# Patient Record
Sex: FEMALE | Race: WHITE | NOT HISPANIC OR LATINO | Employment: FULL TIME | URBAN - METROPOLITAN AREA
[De-identification: names, ages, dates, MRNs, and addresses within clinical notes are randomized per-mention and may not be internally consistent; named-entity substitution may affect disease eponyms.]

---

## 2022-02-18 NOTE — PROGRESS NOTES
Subjective:      Patient ID: Marilu Garcia is a 51 y.o. female.    Chief Complaint: Disease Management    HPI:   Includes joint pain without subjective swelling.   Antecedent event includes: None;  Pain location includes: right elbow, right hand and left hip;  Gradual onset; beginning >years ago;  Intermittent sharp, throbbing and numbness, Moderate in severity,   Lasting >minutes, Worse at all times;   Improved with none;   Worsened with activity and overuse;     Review of Systems   Constitutional: Positive for fatigue. Negative for chills and fever.   HENT: Negative for nasal congestion, ear pain, hearing loss, mouth dryness, mouth sores, nosebleeds and trouble swallowing.    Eyes: Negative for photophobia, pain, redness, visual disturbance and eye dryness.   Respiratory: Negative for cough and shortness of breath.    Cardiovascular: Negative for chest pain, palpitations and leg swelling.   Gastrointestinal: Negative for abdominal distention, abdominal pain, blood in stool, constipation, diarrhea, rectal pain, vomiting and fecal incontinence.   Endocrine: Negative for polydipsia and polyuria.   Genitourinary: Negative for bladder incontinence, dysuria, enuresis, genital sores and hematuria.   Musculoskeletal: Positive for arthralgias and myalgias. Negative for joint swelling.   Integumentary:  Negative for rash, wound and mole/lesion.   Neurological: Positive for weakness and headaches.   Hematological: Negative for adenopathy. Does not bruise/bleed easily.   Psychiatric/Behavioral: Negative for dysphoric mood and sleep disturbance. The patient is not nervous/anxious.       Past Medical History:   Diagnosis Date    Anxiety     Thyroid disease      Past Surgical History:   Procedure Laterality Date    FINGER SURGERY      gastric sleeve      HYSTERECTOMY      TONSILLECTOMY        See the Assessment/Plan for further characterization of the HPI, ROS, Medical, Surgical, Family, and Social Histories including Tobacco  "use, Meds; all of which has been reviewed in Epic.          Objective:   /66   Pulse 81   Resp 16   Ht 5' 4" (1.626 m)   Wt 56.2 kg (124 lb)   SpO2 99%   BMI 21.28 kg/m²   Physical Exam  Non-toxic appearance. No distress.   Normocephalic and atraumatic.   External ears normal.    Conjunctivae and EOM are normal. No scleral icterus.   RRR, No friction rub; palpable distal pulses.    No tachypnea or signs of respiratory distress.   Abdominal: No guarding or rebound tenderness.   Neurological: Oriented. Normal thought content. No cranial nerve deficit. Strength is grossly normal without focal deficit.  Skin is warm. No pallor.   Musculoskeletal: see below for further input. No overt synovitis.     No image results found.    No results found for any previous visit.        All of the data above and below has been reviewed by myself and any further interpretations will be reflected in the assessment and plan.   The data includes review of external notes, and independent interpretation of lab results, x-rays, and imaging reports.  Active inflammatory arthritis is an illness that poses a threat to bodily function and even a threat to life in some cases.  Drug therapy to treat inflammatory arthritis usually requires intensive monitoring for toxicity.    Review of patient's allergies indicates:  No Known Allergies  Assessment/Plan:         CAMERON+ 1:160 RF neg CRP<0.5; ESR 12  There is some history to suggest inflammatory arthritis.    There is also some mechanical and neurogenic pain.    She has some right elbow and right hand pain in ulnar distribution    Intermittent left hip pain    History trigger fingers s/p injection    Prior trigger finger surgery    Orthopedics Dr. William    Has been managing with:    Ibuprofen otc frequent    Prior tylenol codeine      Prior gastric sleeve should be careful with NSAIDs?    No overt synovitis  DJD  Also likely some spinal DJD DDD +/- scoliosis given her exam    Verbal " education fairly extensive    Blood work to further evaluate    EMG/NCS RUE given the distribution of pain     We will seek records from PCP    Revisit 3-4 weeks    Contact us prn      Has been working with:    Records Orthopedics Dr. William some of note 10/21/21: trigger finger s/p injection 10/14/21; tylenol codeine; CAMERON+ 1:160; vitD 28 Add 50,000 weekly; refer to Rheumatology  Lab 10/14/21: WBC 6.3; Hgb 13; plt 228; creat 0.7; alb 4.4 CRP<0.5; ESR 12    Review of medical records as stated above.  Lab +/- imaging and other ordered diagnostic studies to further evaluate.  See the orders associated with this note visit.  Medications as prescribed as tolerated.    Education including verbal and those noted in the patient instructions.  Revisit as scheduled and call prn.    The following diagnoses influence medical decision making and/or need further workup including the orders listed below:    Pain in other joint  -     CAMERON by IFA, w/Rflx; Future; Expected date: 03/04/2022  -     Protein Electrophoresis, Serum; Future; Expected date: 03/04/2022  -     Quantiferon Gold TB; Future; Expected date: 03/04/2022  -     Rheumatoid Factor; Future; Expected date: 03/04/2022  -     Sedimentation rate; Future; Expected date: 03/04/2022  -     Uric Acid; Future; Expected date: 03/04/2022  -     Urinalysis; Future; Expected date: 03/04/2022  -     Cyclic Citrullinated Peptide Antibody, IgG; Future; Expected date: 03/04/2022  -     C-Reactive Protein; Future; Expected date: 03/04/2022  -     Comprehensive Metabolic Panel; Future; Expected date: 03/04/2022  -     CK; Future; Expected date: 03/04/2022  -     CBC Auto Differential; Future; Expected date: 03/04/2022  -     C4 Complement; Future; Expected date: 03/04/2022  -     C3 Complement; Future; Expected date: 03/04/2022  -     Hepatitis C Antibody; Future; Expected date: 03/04/2022  -     Hepatitis B Surface Antigen; Future; Expected date: 03/04/2022  -     Hepatitis B Core  Antibody, Total; Future; Expected date: 03/04/2022  -     Hepatitis B Surface Ab, Qualitative; Future; Expected date: 03/04/2022    Polyarthralgia  -     CAMERON by IFA, w/Rflx; Future; Expected date: 03/04/2022  -     Protein Electrophoresis, Serum; Future; Expected date: 03/04/2022  -     Quantiferon Gold TB; Future; Expected date: 03/04/2022  -     Rheumatoid Factor; Future; Expected date: 03/04/2022  -     Sedimentation rate; Future; Expected date: 03/04/2022  -     Uric Acid; Future; Expected date: 03/04/2022  -     Urinalysis; Future; Expected date: 03/04/2022  -     Cyclic Citrullinated Peptide Antibody, IgG; Future; Expected date: 03/04/2022  -     C-Reactive Protein; Future; Expected date: 03/04/2022  -     Comprehensive Metabolic Panel; Future; Expected date: 03/04/2022  -     CK; Future; Expected date: 03/04/2022  -     CBC Auto Differential; Future; Expected date: 03/04/2022  -     C4 Complement; Future; Expected date: 03/04/2022  -     C3 Complement; Future; Expected date: 03/04/2022  -     Hepatitis C Antibody; Future; Expected date: 03/04/2022  -     Hepatitis B Surface Antigen; Future; Expected date: 03/04/2022  -     Hepatitis B Core Antibody, Total; Future; Expected date: 03/04/2022  -     Hepatitis B Surface Ab, Qualitative; Future; Expected date: 03/04/2022    CAMERON positive  -     CAMERON by IFA, w/Rflx; Future; Expected date: 03/04/2022  -     Protein Electrophoresis, Serum; Future; Expected date: 03/04/2022  -     Quantiferon Gold TB; Future; Expected date: 03/04/2022  -     Rheumatoid Factor; Future; Expected date: 03/04/2022  -     Sedimentation rate; Future; Expected date: 03/04/2022  -     Uric Acid; Future; Expected date: 03/04/2022  -     Urinalysis; Future; Expected date: 03/04/2022  -     Cyclic Citrullinated Peptide Antibody, IgG; Future; Expected date: 03/04/2022  -     C-Reactive Protein; Future; Expected date: 03/04/2022  -     Comprehensive Metabolic Panel; Future; Expected date:  03/04/2022  -     CK; Future; Expected date: 03/04/2022  -     CBC Auto Differential; Future; Expected date: 03/04/2022  -     C4 Complement; Future; Expected date: 03/04/2022  -     C3 Complement; Future; Expected date: 03/04/2022  -     Hepatitis C Antibody; Future; Expected date: 03/04/2022  -     Hepatitis B Surface Antigen; Future; Expected date: 03/04/2022  -     Hepatitis B Core Antibody, Total; Future; Expected date: 03/04/2022  -     Hepatitis B Surface Ab, Qualitative; Future; Expected date: 03/04/2022    Other osteoarthritis involving multiple joints  -     CAMERON by IFA, w/Rflx; Future; Expected date: 03/04/2022  -     Protein Electrophoresis, Serum; Future; Expected date: 03/04/2022  -     Quantiferon Gold TB; Future; Expected date: 03/04/2022  -     Rheumatoid Factor; Future; Expected date: 03/04/2022  -     Sedimentation rate; Future; Expected date: 03/04/2022  -     Uric Acid; Future; Expected date: 03/04/2022  -     Urinalysis; Future; Expected date: 03/04/2022  -     Cyclic Citrullinated Peptide Antibody, IgG; Future; Expected date: 03/04/2022  -     C-Reactive Protein; Future; Expected date: 03/04/2022  -     Comprehensive Metabolic Panel; Future; Expected date: 03/04/2022  -     CK; Future; Expected date: 03/04/2022  -     CBC Auto Differential; Future; Expected date: 03/04/2022  -     C4 Complement; Future; Expected date: 03/04/2022  -     C3 Complement; Future; Expected date: 03/04/2022  -     Hepatitis C Antibody; Future; Expected date: 03/04/2022  -     Hepatitis B Surface Antigen; Future; Expected date: 03/04/2022  -     Hepatitis B Core Antibody, Total; Future; Expected date: 03/04/2022  -     Hepatitis B Surface Ab, Qualitative; Future; Expected date: 03/04/2022    Neuralgia  -     CAMERON by IFA, w/Rflx; Future; Expected date: 03/04/2022  -     Protein Electrophoresis, Serum; Future; Expected date: 03/04/2022  -     Quantiferon Gold TB; Future; Expected date: 03/04/2022  -     Rheumatoid Factor;  Future; Expected date: 03/04/2022  -     Sedimentation rate; Future; Expected date: 03/04/2022  -     Uric Acid; Future; Expected date: 03/04/2022  -     Urinalysis; Future; Expected date: 03/04/2022  -     Cyclic Citrullinated Peptide Antibody, IgG; Future; Expected date: 03/04/2022  -     C-Reactive Protein; Future; Expected date: 03/04/2022  -     Comprehensive Metabolic Panel; Future; Expected date: 03/04/2022  -     CK; Future; Expected date: 03/04/2022  -     CBC Auto Differential; Future; Expected date: 03/04/2022  -     C4 Complement; Future; Expected date: 03/04/2022  -     C3 Complement; Future; Expected date: 03/04/2022  -     Hepatitis C Antibody; Future; Expected date: 03/04/2022  -     Hepatitis B Surface Antigen; Future; Expected date: 03/04/2022  -     Hepatitis B Core Antibody, Total; Future; Expected date: 03/04/2022  -     Hepatitis B Surface Ab, Qualitative; Future; Expected date: 03/04/2022  -     EMG W/ ULTRASOUND AND NERVE CONDUCTION TEST 1 Extremity; Future; Expected date: 03/04/2022    Myalgia  -     CAMERON by IFA, w/Rflx; Future; Expected date: 03/04/2022  -     Protein Electrophoresis, Serum; Future; Expected date: 03/04/2022  -     Quantiferon Gold TB; Future; Expected date: 03/04/2022  -     Rheumatoid Factor; Future; Expected date: 03/04/2022  -     Sedimentation rate; Future; Expected date: 03/04/2022  -     Uric Acid; Future; Expected date: 03/04/2022  -     Urinalysis; Future; Expected date: 03/04/2022  -     Cyclic Citrullinated Peptide Antibody, IgG; Future; Expected date: 03/04/2022  -     C-Reactive Protein; Future; Expected date: 03/04/2022  -     Comprehensive Metabolic Panel; Future; Expected date: 03/04/2022  -     CK; Future; Expected date: 03/04/2022  -     CBC Auto Differential; Future; Expected date: 03/04/2022  -     C4 Complement; Future; Expected date: 03/04/2022  -     C3 Complement; Future; Expected date: 03/04/2022  -     Hepatitis C Antibody; Future; Expected date:  03/04/2022  -     Hepatitis B Surface Antigen; Future; Expected date: 03/04/2022  -     Hepatitis B Core Antibody, Total; Future; Expected date: 03/04/2022  -     Hepatitis B Surface Ab, Qualitative; Future; Expected date: 03/04/2022    Screening for rheumatic disorder  -     CAMERON by IFA, w/Rflx; Future; Expected date: 03/04/2022  -     Protein Electrophoresis, Serum; Future; Expected date: 03/04/2022  -     Quantiferon Gold TB; Future; Expected date: 03/04/2022  -     Rheumatoid Factor; Future; Expected date: 03/04/2022  -     Sedimentation rate; Future; Expected date: 03/04/2022  -     Uric Acid; Future; Expected date: 03/04/2022  -     Urinalysis; Future; Expected date: 03/04/2022  -     Cyclic Citrullinated Peptide Antibody, IgG; Future; Expected date: 03/04/2022  -     C-Reactive Protein; Future; Expected date: 03/04/2022  -     Comprehensive Metabolic Panel; Future; Expected date: 03/04/2022  -     CK; Future; Expected date: 03/04/2022  -     CBC Auto Differential; Future; Expected date: 03/04/2022  -     C4 Complement; Future; Expected date: 03/04/2022  -     C3 Complement; Future; Expected date: 03/04/2022  -     Hepatitis C Antibody; Future; Expected date: 03/04/2022  -     Hepatitis B Surface Antigen; Future; Expected date: 03/04/2022  -     Hepatitis B Core Antibody, Total; Future; Expected date: 03/04/2022  -     Hepatitis B Surface Ab, Qualitative; Future; Expected date: 03/04/2022    Encounter for hepatitis C screening test for low risk patient  -     CAMERON by IFA, w/Rflx; Future; Expected date: 03/04/2022  -     Protein Electrophoresis, Serum; Future; Expected date: 03/04/2022  -     Quantiferon Gold TB; Future; Expected date: 03/04/2022  -     Rheumatoid Factor; Future; Expected date: 03/04/2022  -     Sedimentation rate; Future; Expected date: 03/04/2022  -     Uric Acid; Future; Expected date: 03/04/2022  -     Urinalysis; Future; Expected date: 03/04/2022  -     Cyclic Citrullinated Peptide Antibody,  IgG; Future; Expected date: 03/04/2022  -     C-Reactive Protein; Future; Expected date: 03/04/2022  -     Comprehensive Metabolic Panel; Future; Expected date: 03/04/2022  -     CK; Future; Expected date: 03/04/2022  -     CBC Auto Differential; Future; Expected date: 03/04/2022  -     C4 Complement; Future; Expected date: 03/04/2022  -     C3 Complement; Future; Expected date: 03/04/2022  -     Hepatitis C Antibody; Future; Expected date: 03/04/2022  -     Hepatitis B Surface Antigen; Future; Expected date: 03/04/2022  -     Hepatitis B Core Antibody, Total; Future; Expected date: 03/04/2022  -     Hepatitis B Surface Ab, Qualitative; Future; Expected date: 03/04/2022      Follow up in about 4 weeks (around 4/1/2022).     Dre Chow MD

## 2022-03-04 ENCOUNTER — OFFICE VISIT (OUTPATIENT)
Dept: RHEUMATOLOGY | Facility: CLINIC | Age: 52
End: 2022-03-04
Payer: MEDICAID

## 2022-03-04 VITALS
SYSTOLIC BLOOD PRESSURE: 103 MMHG | HEIGHT: 64 IN | OXYGEN SATURATION: 99 % | HEART RATE: 81 BPM | BODY MASS INDEX: 21.17 KG/M2 | RESPIRATION RATE: 16 BRPM | DIASTOLIC BLOOD PRESSURE: 66 MMHG | WEIGHT: 124 LBS

## 2022-03-04 DIAGNOSIS — M25.59 PAIN IN OTHER JOINT: Primary | ICD-10-CM

## 2022-03-04 DIAGNOSIS — Z13.89 SCREENING FOR RHEUMATIC DISORDER: ICD-10-CM

## 2022-03-04 DIAGNOSIS — R76.8 ANA POSITIVE: ICD-10-CM

## 2022-03-04 DIAGNOSIS — Z11.59 ENCOUNTER FOR HEPATITIS C SCREENING TEST FOR LOW RISK PATIENT: ICD-10-CM

## 2022-03-04 DIAGNOSIS — M15.8 OTHER OSTEOARTHRITIS INVOLVING MULTIPLE JOINTS: ICD-10-CM

## 2022-03-04 DIAGNOSIS — M79.2 NEURALGIA: ICD-10-CM

## 2022-03-04 DIAGNOSIS — M25.50 POLYARTHRALGIA: ICD-10-CM

## 2022-03-04 DIAGNOSIS — M79.10 MYALGIA: ICD-10-CM

## 2022-03-04 PROCEDURE — 3008F PR BODY MASS INDEX (BMI) DOCUMENTED: ICD-10-PCS | Mod: CPTII,S$GLB,, | Performed by: INTERNAL MEDICINE

## 2022-03-04 PROCEDURE — 99204 PR OFFICE/OUTPT VISIT, NEW, LEVL IV, 45-59 MIN: ICD-10-PCS | Mod: S$GLB,,, | Performed by: INTERNAL MEDICINE

## 2022-03-04 PROCEDURE — 3078F PR MOST RECENT DIASTOLIC BLOOD PRESSURE < 80 MM HG: ICD-10-PCS | Mod: CPTII,S$GLB,, | Performed by: INTERNAL MEDICINE

## 2022-03-04 PROCEDURE — 1159F PR MEDICATION LIST DOCUMENTED IN MEDICAL RECORD: ICD-10-PCS | Mod: CPTII,S$GLB,, | Performed by: INTERNAL MEDICINE

## 2022-03-04 PROCEDURE — 3078F DIAST BP <80 MM HG: CPT | Mod: CPTII,S$GLB,, | Performed by: INTERNAL MEDICINE

## 2022-03-04 PROCEDURE — 3074F SYST BP LT 130 MM HG: CPT | Mod: CPTII,S$GLB,, | Performed by: INTERNAL MEDICINE

## 2022-03-04 PROCEDURE — 1160F PR REVIEW ALL MEDS BY PRESCRIBER/CLIN PHARMACIST DOCUMENTED: ICD-10-PCS | Mod: CPTII,S$GLB,, | Performed by: INTERNAL MEDICINE

## 2022-03-04 PROCEDURE — 1160F RVW MEDS BY RX/DR IN RCRD: CPT | Mod: CPTII,S$GLB,, | Performed by: INTERNAL MEDICINE

## 2022-03-04 PROCEDURE — 99204 OFFICE O/P NEW MOD 45 MIN: CPT | Mod: S$GLB,,, | Performed by: INTERNAL MEDICINE

## 2022-03-04 PROCEDURE — 3008F BODY MASS INDEX DOCD: CPT | Mod: CPTII,S$GLB,, | Performed by: INTERNAL MEDICINE

## 2022-03-04 PROCEDURE — 1159F MED LIST DOCD IN RCRD: CPT | Mod: CPTII,S$GLB,, | Performed by: INTERNAL MEDICINE

## 2022-03-04 PROCEDURE — 3074F PR MOST RECENT SYSTOLIC BLOOD PRESSURE < 130 MM HG: ICD-10-PCS | Mod: CPTII,S$GLB,, | Performed by: INTERNAL MEDICINE

## 2022-03-04 RX ORDER — ERGOCALCIFEROL 1.25 MG/1
CAPSULE ORAL
COMMUNITY
Start: 2021-12-12

## 2022-03-04 RX ORDER — LEVOTHYROXINE SODIUM 125 UG/1
125 TABLET ORAL
COMMUNITY

## 2022-03-04 RX ORDER — SERTRALINE HYDROCHLORIDE 100 MG/1
100 TABLET, FILM COATED ORAL DAILY
COMMUNITY
Start: 2022-02-14

## 2022-04-05 NOTE — PROGRESS NOTES
Subjective:      Patient ID: Marilu Garcia is a 51 y.o. female.    Chief Complaint: Disease Management    HPI:   Includes joint pain without subjective swelling.   Antecedent event includes: None;  Pain location includes: right elbow;  Gradual onset; beginning >years ago;  Intermittent ache, Mild in severity,   Lasting >minutes, Worse during the daytime;   Improved with rest, medication, change in position and none;   Worsened with activity and overuse;     Review of Systems   Constitutional: Positive for fatigue. Negative for chills and fever.   HENT: Negative for nasal congestion, ear pain, hearing loss, mouth dryness, mouth sores, nosebleeds and trouble swallowing.    Eyes: Negative for photophobia, pain, redness, visual disturbance and eye dryness.   Respiratory: Negative for cough and shortness of breath.    Cardiovascular: Negative for chest pain, palpitations and leg swelling.   Gastrointestinal: Negative for abdominal distention, abdominal pain, blood in stool, constipation, diarrhea, rectal pain, vomiting and fecal incontinence.   Endocrine: Negative for polydipsia and polyuria.   Genitourinary: Negative for bladder incontinence, dysuria, enuresis, genital sores and hematuria.   Musculoskeletal: Positive for arthralgias and myalgias. Negative for joint swelling.   Integumentary:  Negative for rash, wound and mole/lesion.   Neurological: Negative for weakness, numbness and headaches.   Hematological: Negative for adenopathy. Does not bruise/bleed easily.   Psychiatric/Behavioral: Negative for dysphoric mood and sleep disturbance. The patient is not nervous/anxious.       Past Medical History:   Diagnosis Date    Anxiety     Thyroid disease      Past Surgical History:   Procedure Laterality Date    FINGER SURGERY      gastric sleeve      HYSTERECTOMY      TONSILLECTOMY        See the Assessment/Plan for further characterization of the HPI, ROS, Medical, Surgical, Family, and Social Histories including  "Tobacco use, Meds; all of which has been reviewed in Epic.    Medication List with Changes/Refills   Current Medications    ERGOCALCIFEROL (ERGOCALCIFEROL) 50,000 UNIT CAP        LEVOTHYROXINE (SYNTHROID) 125 MCG TABLET    Take 125 mcg by mouth before breakfast.    SERTRALINE (ZOLOFT) 100 MG TABLET    Take 100 mg by mouth once daily.         Objective:   BP 95/63   Pulse 73   Resp 15   Ht 5' 4" (1.626 m)   Wt 59 kg (130 lb)   SpO2 98%   BMI 22.31 kg/m²   Physical Exam  Non-toxic appearance. No distress.   Normocephalic and atraumatic.   External ears normal.    Conjunctivae and EOM are normal. No scleral icterus.   RRR, No friction rub; palpable distal pulses.    No tachypnea or signs of respiratory distress.   Abdominal: No guarding or rebound tenderness.   Neurological: Oriented. Normal thought content. No cranial nerve deficit. Strength is grossly normal without focal deficit.  Skin is warm. No pallor.   Musculoskeletal: see below for further input. No overt synovitis.     No image results found.    No visits with results within 1 Year(s) from this visit.   Latest known visit with results is:   No results found for any previous visit.        All of the data above and below has been reviewed by myself and any further interpretations will be reflected in the assessment and plan.   The data includes review of external notes, and independent interpretation of lab results, x-rays, and imaging reports.  Active inflammatory arthritis is an illness that poses a threat to bodily function and even a threat to life in some cases.  Drug therapy to treat inflammatory arthritis usually requires intensive monitoring for toxicity.    Review of patient's allergies indicates:  No Known Allergies  Assessment/Plan:         Prev noted/prior plan:  (No overt synovitis  DJD  Also likely some spinal DJD DDD +/- scoliosis given her exam     Verbal education fairly extensive     Blood work to further evaluate     EMG/NCS RUE given " the distribution of pain      We will seek records from PCP     Revisit 3-4 weeks     Contact us prn)    This visit:    Interim lab 4/4/22:    SPEP normal      CAMERON neg RF neg CCP neg HepBsAbnegcoreAb neg  HCV neg uric acid 3.9    CPK 72    WBC 4.9; Hgb 13; plt 211    C3 47    C4 28      Creat 0.7; alb 4.6    2022 TB negative      ESR 14 CRP<0.5      Interim 4/1/22 EMG/NCS RUE given the distribution of pain: normal      Interim records from PCP: not sent      Ibuprofen otc frequent    She has some interim pain in right elbow and myalgias    She also has chronic right ankle problem since injury 5 months ago     she is working on seeing Orthopedics; she has held off but has plans and could also work on the elbow with them if needed    No overt synovitis  Right hand surgical scar mild deformity DJD  Also likely some spinal DJD DDD +/- scoliosis given her exam    Went over lab in detail and EMG/NCS    The low C3 normal ESR is noted; repeat CAMERON neg    We need to observe    Consider plaquenil if any persistent symptoms suggestive inflammatory arthritis     She is to see Orthopedics    We will reseek records from PCP    Revisit 4 months with lab 2 weeks prior      Contact us prn      CAMERON+ 1:160 repeat CAMERON neg CAMERON neg RF neg CCP neg HepBsAbnegcoreAb neg  HCV neg uric acid 3.9  RF neg CRP<0.5; ESR 12  There is some history to suggest inflammatory arthritis.     There is also some mechanical and neurogenic pain.     She has some right elbow and right hand pain in ulnar distribution     Intermittent left hip pain     History trigger fingers s/p injection     Prior trigger finger surgery     Orthopedics Dr. William     Has been managing with:     Ibuprofen otc frequent     Prior tylenol codeine      Prior gastric sleeve should be careful with NSAIDs?     Prev noted/prior plan:  (No overt synovitis  DJD  Also likely some spinal DJD DDD +/- scoliosis given her exam  Verbal education fairly extensive   Blood work to further  evaluate   EMG/NCS RUE given the distribution of pain    We will seek records from PCP  Revisit 3-4 weeks  Contact us prn)     Has been working with:     Records Orthopedics Dr. William some of note 10/21/21: trigger finger s/p injection 10/14/21; tylenol codeine; CAMERON+ 1:160; vitD 28 Add 50,000 weekly; refer to Rheumatology  Lab 10/14/21: WBC 6.3; Hgb 13; plt 228; creat 0.7; alb 4.4 CRP<0.5; ESR 12     Review of medical records as stated above.  Lab +/- imaging and other ordered diagnostic studies to further evaluate.  See the orders associated with this note visit.  Medications as prescribed as tolerated.    Education including verbal and those noted in the patient instructions.  Revisit as scheduled and call prn.    The following diagnoses influence medical decision making and/or need further workup including the orders listed below:    Pain in other joint  -     CAMERON by IFA, w/Rflx; Future; Expected date: 08/01/2022  -     Comprehensive Metabolic Panel; Future; Expected date: 08/01/2022  -     C-Reactive Protein; Future; Expected date: 08/01/2022  -     Anti-DNA Ab, Double-Stranded; Future; Expected date: 08/01/2022  -     C3 Complement; Future; Expected date: 08/01/2022  -     C4 Complement; Future; Expected date: 08/01/2022  -     CBC Auto Differential; Future; Expected date: 08/01/2022  -     Urinalysis Microscopic; Future; Expected date: 08/01/2022  -     Sedimentation rate; Future; Expected date: 08/01/2022  -     CK; Future; Expected date: 08/01/2022    Right elbow tendonitis    Polyarthralgia  -     CAMERON by IFA, w/Rflx; Future; Expected date: 08/01/2022  -     Comprehensive Metabolic Panel; Future; Expected date: 08/01/2022  -     C-Reactive Protein; Future; Expected date: 08/01/2022  -     Anti-DNA Ab, Double-Stranded; Future; Expected date: 08/01/2022  -     C3 Complement; Future; Expected date: 08/01/2022  -     C4 Complement; Future; Expected date: 08/01/2022  -     CBC Auto Differential; Future; Expected  date: 08/01/2022  -     Urinalysis Microscopic; Future; Expected date: 08/01/2022  -     Sedimentation rate; Future; Expected date: 08/01/2022  -     CK; Future; Expected date: 08/01/2022    Other osteoarthritis involving multiple joints  -     CAMERON by IFA, w/Rflx; Future; Expected date: 08/01/2022  -     Comprehensive Metabolic Panel; Future; Expected date: 08/01/2022  -     C-Reactive Protein; Future; Expected date: 08/01/2022  -     Anti-DNA Ab, Double-Stranded; Future; Expected date: 08/01/2022  -     C3 Complement; Future; Expected date: 08/01/2022  -     C4 Complement; Future; Expected date: 08/01/2022  -     CBC Auto Differential; Future; Expected date: 08/01/2022  -     Urinalysis Microscopic; Future; Expected date: 08/01/2022  -     Sedimentation rate; Future; Expected date: 08/01/2022  -     CK; Future; Expected date: 08/01/2022    Lumbar degenerative disc disease    Nonspecific abnormal finding  -     CAMERON by IFA, w/Rflx; Future; Expected date: 08/01/2022  -     Comprehensive Metabolic Panel; Future; Expected date: 08/01/2022  -     C-Reactive Protein; Future; Expected date: 08/01/2022  -     Anti-DNA Ab, Double-Stranded; Future; Expected date: 08/01/2022  -     C3 Complement; Future; Expected date: 08/01/2022  -     C4 Complement; Future; Expected date: 08/01/2022  -     CBC Auto Differential; Future; Expected date: 08/01/2022  -     Urinalysis Microscopic; Future; Expected date: 08/01/2022  -     Sedimentation rate; Future; Expected date: 08/01/2022  -     CK; Future; Expected date: 08/01/2022    Myalgia    Screening for rheumatic disorder  -     CAMERON by IFA, w/Rflx; Future; Expected date: 08/01/2022  -     Comprehensive Metabolic Panel; Future; Expected date: 08/01/2022  -     C-Reactive Protein; Future; Expected date: 08/01/2022  -     Anti-DNA Ab, Double-Stranded; Future; Expected date: 08/01/2022  -     C3 Complement; Future; Expected date: 08/01/2022  -     C4 Complement; Future; Expected date:  08/01/2022  -     CBC Auto Differential; Future; Expected date: 08/01/2022  -     Urinalysis Microscopic; Future; Expected date: 08/01/2022  -     Sedimentation rate; Future; Expected date: 08/01/2022  -     CK; Future; Expected date: 08/01/2022    Medication monitoring encounter  -     CAMERON by IFA, w/Rflx; Future; Expected date: 08/01/2022  -     Comprehensive Metabolic Panel; Future; Expected date: 08/01/2022  -     C-Reactive Protein; Future; Expected date: 08/01/2022  -     Anti-DNA Ab, Double-Stranded; Future; Expected date: 08/01/2022  -     C3 Complement; Future; Expected date: 08/01/2022  -     C4 Complement; Future; Expected date: 08/01/2022  -     CBC Auto Differential; Future; Expected date: 08/01/2022  -     Urinalysis Microscopic; Future; Expected date: 08/01/2022  -     Sedimentation rate; Future; Expected date: 08/01/2022  -     CK; Future; Expected date: 08/01/2022      Follow up in about 4 months (around 8/19/2022).     Dre Chow MD

## 2022-04-19 ENCOUNTER — OFFICE VISIT (OUTPATIENT)
Dept: RHEUMATOLOGY | Facility: CLINIC | Age: 52
End: 2022-04-19
Payer: MEDICAID

## 2022-04-19 VITALS
WEIGHT: 130 LBS | DIASTOLIC BLOOD PRESSURE: 63 MMHG | BODY MASS INDEX: 22.2 KG/M2 | HEART RATE: 73 BPM | OXYGEN SATURATION: 98 % | SYSTOLIC BLOOD PRESSURE: 95 MMHG | HEIGHT: 64 IN | RESPIRATION RATE: 15 BRPM

## 2022-04-19 DIAGNOSIS — M25.59 PAIN IN OTHER JOINT: Primary | ICD-10-CM

## 2022-04-19 DIAGNOSIS — Z51.81 MEDICATION MONITORING ENCOUNTER: ICD-10-CM

## 2022-04-19 DIAGNOSIS — M15.8 OTHER OSTEOARTHRITIS INVOLVING MULTIPLE JOINTS: ICD-10-CM

## 2022-04-19 DIAGNOSIS — M25.50 POLYARTHRALGIA: ICD-10-CM

## 2022-04-19 DIAGNOSIS — M79.10 MYALGIA: ICD-10-CM

## 2022-04-19 DIAGNOSIS — R68.89 NONSPECIFIC ABNORMAL FINDING: ICD-10-CM

## 2022-04-19 DIAGNOSIS — Z13.89 SCREENING FOR RHEUMATIC DISORDER: ICD-10-CM

## 2022-04-19 DIAGNOSIS — M51.36 LUMBAR DEGENERATIVE DISC DISEASE: ICD-10-CM

## 2022-04-19 DIAGNOSIS — M77.8 RIGHT ELBOW TENDONITIS: ICD-10-CM

## 2022-04-19 PROCEDURE — 3078F PR MOST RECENT DIASTOLIC BLOOD PRESSURE < 80 MM HG: ICD-10-PCS | Mod: CPTII,S$GLB,, | Performed by: INTERNAL MEDICINE

## 2022-04-19 PROCEDURE — 1159F PR MEDICATION LIST DOCUMENTED IN MEDICAL RECORD: ICD-10-PCS | Mod: CPTII,S$GLB,, | Performed by: INTERNAL MEDICINE

## 2022-04-19 PROCEDURE — 3074F SYST BP LT 130 MM HG: CPT | Mod: CPTII,S$GLB,, | Performed by: INTERNAL MEDICINE

## 2022-04-19 PROCEDURE — 3008F PR BODY MASS INDEX (BMI) DOCUMENTED: ICD-10-PCS | Mod: CPTII,S$GLB,, | Performed by: INTERNAL MEDICINE

## 2022-04-19 PROCEDURE — 1160F RVW MEDS BY RX/DR IN RCRD: CPT | Mod: CPTII,S$GLB,, | Performed by: INTERNAL MEDICINE

## 2022-04-19 PROCEDURE — 1159F MED LIST DOCD IN RCRD: CPT | Mod: CPTII,S$GLB,, | Performed by: INTERNAL MEDICINE

## 2022-04-19 PROCEDURE — 1160F PR REVIEW ALL MEDS BY PRESCRIBER/CLIN PHARMACIST DOCUMENTED: ICD-10-PCS | Mod: CPTII,S$GLB,, | Performed by: INTERNAL MEDICINE

## 2022-04-19 PROCEDURE — 99214 PR OFFICE/OUTPT VISIT, EST, LEVL IV, 30-39 MIN: ICD-10-PCS | Mod: S$GLB,,, | Performed by: INTERNAL MEDICINE

## 2022-04-19 PROCEDURE — 3008F BODY MASS INDEX DOCD: CPT | Mod: CPTII,S$GLB,, | Performed by: INTERNAL MEDICINE

## 2022-04-19 PROCEDURE — 99214 OFFICE O/P EST MOD 30 MIN: CPT | Mod: S$GLB,,, | Performed by: INTERNAL MEDICINE

## 2022-04-19 PROCEDURE — 3078F DIAST BP <80 MM HG: CPT | Mod: CPTII,S$GLB,, | Performed by: INTERNAL MEDICINE

## 2022-04-19 PROCEDURE — 3074F PR MOST RECENT SYSTOLIC BLOOD PRESSURE < 130 MM HG: ICD-10-PCS | Mod: CPTII,S$GLB,, | Performed by: INTERNAL MEDICINE
